# Patient Record
Sex: MALE | Race: WHITE | NOT HISPANIC OR LATINO | URBAN - METROPOLITAN AREA
[De-identification: names, ages, dates, MRNs, and addresses within clinical notes are randomized per-mention and may not be internally consistent; named-entity substitution may affect disease eponyms.]

---

## 2022-11-26 ENCOUNTER — OFFICE VISIT (OUTPATIENT)
Dept: URGENT CARE | Age: 72
End: 2022-11-26

## 2022-11-26 VITALS
OXYGEN SATURATION: 98 % | TEMPERATURE: 97.8 F | HEIGHT: 69 IN | HEART RATE: 72 BPM | BODY MASS INDEX: 29.18 KG/M2 | DIASTOLIC BLOOD PRESSURE: 94 MMHG | RESPIRATION RATE: 20 BRPM | SYSTOLIC BLOOD PRESSURE: 154 MMHG | WEIGHT: 197 LBS

## 2022-11-26 DIAGNOSIS — M54.2 NECK PAIN: Primary | ICD-10-CM

## 2022-11-26 RX ORDER — CYCLOBENZAPRINE HCL 10 MG
10 TABLET ORAL 3 TIMES DAILY PRN
Qty: 21 TABLET | Refills: 0 | Status: SHIPPED | OUTPATIENT
Start: 2022-11-26 | End: 2022-12-03

## 2022-11-26 RX ORDER — MULTIVIT-MIN/IRON FUM/FOLIC AC 7.5 MG-4
1 TABLET ORAL DAILY
COMMUNITY

## 2022-11-26 RX ORDER — METHOTREXATE 2.5 MG/1
TABLET ORAL
COMMUNITY
Start: 2022-10-04

## 2022-11-26 RX ORDER — LEVETIRACETAM 500 MG/1
TABLET ORAL
COMMUNITY
Start: 2022-09-09

## 2022-11-26 NOTE — PATIENT INSTRUCTIONS
Flexeril 10mg three times per day  Voltaren 50mg three times per day  Put ice and heat on the area every hour  Ice for 20 minutes, then heat for 20 minutes, then 20 minute break  Avoid strenuous activity for 1 week  Do not drive or operate heavy machinery while on Flexeril  Follow up with PCP in 3-5 days  Proceed to  ER if symptoms worsen

## 2022-11-26 NOTE — PROGRESS NOTES
[address]        NAME: Wilmer Diaz is a 71 y o  male  : 1950    MRN: [id number]  DATE: 2022  TIME: 9:06 AM    Assessment and Plan   Neck pain [M54 2]  1  Neck pain  diclofenac sodium (VOLTAREN) 50 mg EC tablet    cyclobenzaprine (FLEXERIL) 10 mg tablet            Patient Instructions   Flexeril 10mg three times per day  Voltaren 50mg three times per day  Put ice and heat on the area every hour  Ice for 20 minutes, then heat for 20 minutes, then 20 minute break  Avoid strenuous activity for 1 week  Do not drive or operate heavy machinery while on Flexeril  Follow up with PCP in 3-5 days  Proceed to  ER if symptoms worsen  Chief Complaint     Chief Complaint   Patient presents with   • Neck Pain     Stiff neck/neck pain x 3 days         History of Present Illness       This is a 71year old male with PMHx chronic neck pain presenting with 3 day history of left sided neck pain  Started after doing a lot of yard work involving heavy lifting and and twisting motions  Pain worse with turning neck left/right and tilting head upwards  Also some pain in this area with swallowing  Pain radiates up left side of head and upper left shoulder  No decreased muscle strength or ROM in upper extremities  Denies headache, dizziness, lightheadedness, LOC, N/V, fever, chills, CP, or SOB  PT has history of seizures but no seizure for 1 year  Reports hx of DDD in neck  Has tolerated muscle relaxers in past       Review of Systems   Review of Systems   Constitutional: Negative  HENT: Negative  Eyes: Negative  Respiratory: Negative  Cardiovascular: Negative  Gastrointestinal: Negative  Endocrine: Negative  Genitourinary: Negative  Musculoskeletal: Positive for neck pain and neck stiffness  Negative for arthralgias, back pain, joint swelling and myalgias  Skin: Negative  Allergic/Immunologic: Negative  Neurological: Negative  Hematological: Negative  Psychiatric/Behavioral: Negative  Current Medications       Current Outpatient Medications:   •  Cholecalciferol 25 MCG (1000 UT) tablet, Take 5,000 Units by mouth daily, Disp: , Rfl:   •  cyclobenzaprine (FLEXERIL) 10 mg tablet, Take 1 tablet (10 mg total) by mouth 3 (three) times a day as needed for muscle spasms for up to 7 days, Disp: 21 tablet, Rfl: 0  •  diclofenac sodium (VOLTAREN) 50 mg EC tablet, Take 1 tablet (50 mg total) by mouth 3 (three) times a day for 7 days, Disp: 21 tablet, Rfl: 0  •  levETIRAcetam (KEPPRA) 500 mg tablet, , Disp: , Rfl:   •  methotrexate 2 5 MG tablet, , Disp: , Rfl:   •  Multiple Vitamins-Minerals (multivitamin with minerals) tablet, Take 1 tablet by mouth daily, Disp: , Rfl:     Current Allergies     Allergies as of 11/26/2022   • (No Known Allergies)            The following portions of the patient's history were reviewed and updated as appropriate: allergies, current medications, past family history, past medical history, past social history, past surgical history and problem list      Past Medical History:   Diagnosis Date   • Arthritis        Past Surgical History:   Procedure Laterality Date   • CATARACT EXTRACTION     • KNEE SURGERY     • SINUS SURGERY     • VASECTOMY         History reviewed  No pertinent family history  Medications have been verified  Objective   /94   Pulse 72   Temp 97 8 °F (36 6 °C)   Resp 20   Ht 5' 9" (1 753 m)   Wt 89 4 kg (197 lb)   SpO2 98%   BMI 29 09 kg/m²   No LMP for male patient  Physical Exam     Physical Exam  Constitutional:       General: He is not in acute distress  Appearance: Normal appearance  He is normal weight  He is not ill-appearing  HENT:      Head: Normocephalic and atraumatic  Nose: Nose normal       Mouth/Throat:      Mouth: Mucous membranes are moist       Pharynx: Oropharynx is clear  Eyes:      Extraocular Movements: Extraocular movements intact  Conjunctiva/sclera: Conjunctivae normal       Pupils: Pupils are equal, round, and reactive to light  Neck:        Comments: Tender area of left neck with firm muscles present  No lymphadenopathy  Decreased active ROM with head rotation bilaterally d/t tenderness  No tenderness of bony processess  Cardiovascular:      Rate and Rhythm: Normal rate and regular rhythm  Heart sounds: Normal heart sounds  No murmur heard  Pulmonary:      Effort: Pulmonary effort is normal  No respiratory distress  Breath sounds: Normal breath sounds  No stridor  No wheezing, rhonchi or rales  Abdominal:      Palpations: Abdomen is soft  Musculoskeletal:         General: Normal range of motion  Cervical back: Neck supple  Tenderness present  Pain with movement present  No spinous process tenderness  Right lower leg: No edema  Left lower leg: No edema  Skin:     General: Skin is warm and dry  Neurological:      General: No focal deficit present  Mental Status: He is alert and oriented to person, place, and time  Psychiatric:         Mood and Affect: Mood normal          Behavior: Behavior normal          Thought Content:  Thought content normal          Judgment: Judgment normal          Julia Sotelo PA-C